# Patient Record
Sex: MALE | Race: BLACK OR AFRICAN AMERICAN | URBAN - METROPOLITAN AREA
[De-identification: names, ages, dates, MRNs, and addresses within clinical notes are randomized per-mention and may not be internally consistent; named-entity substitution may affect disease eponyms.]

---

## 2018-03-02 ENCOUNTER — EMERGENCY (EMERGENCY)
Facility: HOSPITAL | Age: 18
LOS: 1 days | Discharge: HOME | End: 2018-03-02
Attending: PEDIATRICS | Admitting: PEDIATRICS
Payer: COMMERCIAL

## 2018-03-02 VITALS
OXYGEN SATURATION: 100 % | RESPIRATION RATE: 18 BRPM | HEART RATE: 113 BPM | SYSTOLIC BLOOD PRESSURE: 132 MMHG | DIASTOLIC BLOOD PRESSURE: 52 MMHG | TEMPERATURE: 104 F

## 2018-03-02 VITALS — SYSTOLIC BLOOD PRESSURE: 116 MMHG | DIASTOLIC BLOOD PRESSURE: 77 MMHG

## 2018-03-02 LAB
ALBUMIN SERPL ELPH-MCNC: 4.2 G/DL — SIGNIFICANT CHANGE UP (ref 3.3–5)
ALP SERPL-CCNC: 62 U/L — SIGNIFICANT CHANGE UP (ref 40–120)
ALT FLD-CCNC: 8 U/L RC — LOW (ref 10–45)
ANION GAP SERPL CALC-SCNC: 10 MMOL/L — SIGNIFICANT CHANGE UP (ref 5–17)
AST SERPL-CCNC: 16 U/L — SIGNIFICANT CHANGE UP (ref 10–40)
BASOPHILS # BLD AUTO: 0 K/UL — SIGNIFICANT CHANGE UP (ref 0–0.2)
BASOPHILS NFR BLD AUTO: 0 % — SIGNIFICANT CHANGE UP (ref 0–2)
BILIRUB SERPL-MCNC: 1.2 MG/DL — SIGNIFICANT CHANGE UP (ref 0.2–1.2)
BUN SERPL-MCNC: 22 MG/DL — SIGNIFICANT CHANGE UP (ref 7–23)
CALCIUM SERPL-MCNC: 9.3 MG/DL — SIGNIFICANT CHANGE UP (ref 8.4–10.5)
CHLORIDE SERPL-SCNC: 107 MMOL/L — SIGNIFICANT CHANGE UP (ref 96–108)
CO2 SERPL-SCNC: 25 MMOL/L — SIGNIFICANT CHANGE UP (ref 22–31)
CREAT SERPL-MCNC: 1.14 MG/DL — SIGNIFICANT CHANGE UP (ref 0.5–1.3)
EOSINOPHIL # BLD AUTO: 0 K/UL — SIGNIFICANT CHANGE UP (ref 0–0.5)
EOSINOPHIL NFR BLD AUTO: 0.1 % — SIGNIFICANT CHANGE UP (ref 0–6)
GLUCOSE SERPL-MCNC: 115 MG/DL — HIGH (ref 70–99)
HCT VFR BLD CALC: 38.8 % — LOW (ref 39–50)
HGB BLD-MCNC: 13.5 G/DL — SIGNIFICANT CHANGE UP (ref 13–17)
LIDOCAIN IGE QN: 24 U/L — SIGNIFICANT CHANGE UP (ref 7–60)
LYMPHOCYTES # BLD AUTO: 0.6 K/UL — LOW (ref 1–3.3)
LYMPHOCYTES # BLD AUTO: 5 % — LOW (ref 13–44)
MCHC RBC-ENTMCNC: 30.8 PG — SIGNIFICANT CHANGE UP (ref 27–34)
MCHC RBC-ENTMCNC: 34.7 GM/DL — SIGNIFICANT CHANGE UP (ref 32–36)
MCV RBC AUTO: 88.7 FL — SIGNIFICANT CHANGE UP (ref 80–100)
MONOCYTES # BLD AUTO: 0.1 K/UL — SIGNIFICANT CHANGE UP (ref 0–0.9)
MONOCYTES NFR BLD AUTO: 1.2 % — LOW (ref 2–14)
NEUTROPHILS # BLD AUTO: 10.3 K/UL — HIGH (ref 1.8–7.4)
NEUTROPHILS NFR BLD AUTO: 93.7 % — HIGH (ref 43–77)
PLATELET # BLD AUTO: 228 K/UL — SIGNIFICANT CHANGE UP (ref 150–400)
POTASSIUM SERPL-MCNC: 3.6 MMOL/L — SIGNIFICANT CHANGE UP (ref 3.5–5.3)
POTASSIUM SERPL-SCNC: 3.6 MMOL/L — SIGNIFICANT CHANGE UP (ref 3.5–5.3)
PROT SERPL-MCNC: 6.8 G/DL — SIGNIFICANT CHANGE UP (ref 6–8.3)
RBC # BLD: 4.37 M/UL — SIGNIFICANT CHANGE UP (ref 4.2–5.8)
RBC # FLD: 11.2 % — SIGNIFICANT CHANGE UP (ref 10.3–14.5)
S PYO AG SPEC QL IA: NEGATIVE — SIGNIFICANT CHANGE UP
SODIUM SERPL-SCNC: 142 MMOL/L — SIGNIFICANT CHANGE UP (ref 135–145)
WBC # BLD: 11 K/UL — HIGH (ref 3.8–10.5)
WBC # FLD AUTO: 11 K/UL — HIGH (ref 3.8–10.5)

## 2018-03-02 PROCEDURE — 96374 THER/PROPH/DIAG INJ IV PUSH: CPT

## 2018-03-02 PROCEDURE — 82962 GLUCOSE BLOOD TEST: CPT

## 2018-03-02 PROCEDURE — 99284 EMERGENCY DEPT VISIT MOD MDM: CPT | Mod: 25

## 2018-03-02 PROCEDURE — 80053 COMPREHEN METABOLIC PANEL: CPT

## 2018-03-02 PROCEDURE — 83690 ASSAY OF LIPASE: CPT

## 2018-03-02 PROCEDURE — 85027 COMPLETE CBC AUTOMATED: CPT

## 2018-03-02 PROCEDURE — 87081 CULTURE SCREEN ONLY: CPT

## 2018-03-02 PROCEDURE — 87880 STREP A ASSAY W/OPTIC: CPT

## 2018-03-02 PROCEDURE — 99284 EMERGENCY DEPT VISIT MOD MDM: CPT

## 2018-03-02 PROCEDURE — 96375 TX/PRO/DX INJ NEW DRUG ADDON: CPT

## 2018-03-02 RX ORDER — ONDANSETRON 8 MG/1
1 TABLET, FILM COATED ORAL
Qty: 9 | Refills: 0
Start: 2018-03-02 | End: 2018-03-04

## 2018-03-02 RX ORDER — IBUPROFEN 200 MG
600 TABLET ORAL ONCE
Refills: 0 | Status: COMPLETED | OUTPATIENT
Start: 2018-03-02 | End: 2018-03-02

## 2018-03-02 RX ORDER — ACETAMINOPHEN 500 MG
1000 TABLET ORAL ONCE
Refills: 0 | Status: COMPLETED | OUTPATIENT
Start: 2018-03-02 | End: 2018-03-02

## 2018-03-02 RX ORDER — SODIUM CHLORIDE 9 MG/ML
2000 INJECTION INTRAMUSCULAR; INTRAVENOUS; SUBCUTANEOUS ONCE
Refills: 0 | Status: COMPLETED | OUTPATIENT
Start: 2018-03-02 | End: 2018-03-02

## 2018-03-02 RX ORDER — ONDANSETRON 8 MG/1
4 TABLET, FILM COATED ORAL ONCE
Refills: 0 | Status: COMPLETED | OUTPATIENT
Start: 2018-03-02 | End: 2018-03-02

## 2018-03-02 RX ADMIN — Medication 400 MILLIGRAM(S): at 16:49

## 2018-03-02 RX ADMIN — Medication 600 MILLIGRAM(S): at 18:45

## 2018-03-02 RX ADMIN — SODIUM CHLORIDE 2000 MILLILITER(S): 9 INJECTION INTRAMUSCULAR; INTRAVENOUS; SUBCUTANEOUS at 16:49

## 2018-03-02 RX ADMIN — ONDANSETRON 4 MILLIGRAM(S): 8 TABLET, FILM COATED ORAL at 16:49

## 2018-03-02 NOTE — ED PROVIDER NOTE - ATTENDING CONTRIBUTION TO CARE
I performed a history and physical exam of the patient and discussed their management with the resident. I reviewed the resident's note and agree with the documented findings and plan of care.  Beverley Matamoros MD     18y M with vomiting and diarrhea after 3 hours after eating pizza and drinking tea. Other people ate the pizza, no similar symptoms. Had sore throat yesterday, no cough. NO rash. No abd pain other than when vomiting.   Febrile, tachy  Gen: vomiting  HEENT: no conjunctivitis, MMM exudates on bilateral tonsils  SHotty LAD nontender  Neck supple  Cardiac: tachy, normal S1S2  Chest: CTA BL, no wheeze or crackles  Abdomen: normal BS, soft, NT  Extremity: no gross deformity, good perfusion  Skin: no rash  Neuro: grossly normal     AP 18y M with fever, vomiting, diarrhea. Exudates on tonsils. Likely viral gastro, +/- strep. Will send strep test. Labs, fluids, antipyretics, reassess.

## 2018-03-02 NOTE — ED PROVIDER NOTE - MEDICAL DECISION MAKING DETAILS
Patient with vomiting and diarrhea, no abdominal pain, +febrile. Most likely viral syndrome. Sore throat centor 2, low suspicion strep.  Check labs for electrolyte abnormalities, give fluids, zofran reassess.

## 2018-03-02 NOTE — ED PROVIDER NOTE - PROGRESS NOTE DETAILS
Patient feeling better. Awaiting strep. Patient feeling better. strep negative. Tolerating PO  Elizabeth Delgadillo, PGY3

## 2018-03-02 NOTE — ED PROVIDER NOTE - OBJECTIVE STATEMENT
Patient has had a sore throat for 2 days. went out for his birthday last night and smoked a few cigarettes, no alcohol or drug use.  Today felt ill at work after eating pizza and drinking tea, had an episode of loose stool then had a few episodes NBNB vomiting.  Denies abdominal pain, +chills and feeling generally weak and nauseated.  Has taken nothing for symptoms. Febrile on arrival.  Denies  symptoms, sexually active with one partner, occasional condom use, tested for STIs 6 months ago, declines testing today.

## 2018-03-02 NOTE — ED PROVIDER NOTE - CARE PLAN
Principal Discharge DX:	Vomiting  Assessment and plan of treatment:	Take tylenol 650 mg every 6 hours as needed for pain/fever, max daily dose 3250 mg/day.   Take ibuprofen 600 mg every 6 hours as needed for fever not controlled with tylenol, take with food and plenty of water   Maalox and pepcid for upset stomach as directed on packaging.  Take Zofran up to 3 times/day as needed for nausea  Remain well hydrated with 1-2 liters of water/day until urine is clear. Include 12 oz sports drink daily while ill.  Take small frequent sips, avoid drinking or eating large amounts at once.  [place] diet as tolerated (bananas, applesauce, crackers, toast, rice, oatmeal).   See your primary doctor in 2-3 days  Return to emergency department for worsening pain especially if localized to one spot on your abdomen, nausea/vomiting, inability to take water, fever >101 that does not respond to medication, rash, or if you have any new or changing symptoms or concerns

## 2018-03-02 NOTE — ED ADULT NURSE NOTE - OBJECTIVE STATEMENT
Pt bib EMS for eval of chills, vomiting, loose stools which started today after eating pizza,  and sore throat which he has had for 2 days.  He also c/o lightheadedness, but denies passing out.  Had vomited undigested food immediately prior to arrival in ED.

## 2018-03-02 NOTE — ED PROVIDER NOTE - PLAN OF CARE
Take tylenol 650 mg every 6 hours as needed for pain/fever, max daily dose 3250 mg/day.   Take ibuprofen 600 mg every 6 hours as needed for fever not controlled with tylenol, take with food and plenty of water   Maalox and pepcid for upset stomach as directed on packaging.  Take Zofran up to 3 times/day as needed for nausea  Remain well hydrated with 1-2 liters of water/day until urine is clear. Include 12 oz sports drink daily while ill.  Take small frequent sips, avoid drinking or eating large amounts at once.  [place] diet as tolerated (bananas, applesauce, crackers, toast, rice, oatmeal).   See your primary doctor in 2-3 days  Return to emergency department for worsening pain especially if localized to one spot on your abdomen, nausea/vomiting, inability to take water, fever >101 that does not respond to medication, rash, or if you have any new or changing symptoms or concerns

## 2020-05-18 NOTE — ED PROVIDER NOTE - MUSCULOSKELETAL [-], MLM
[facility] agency requested for notes from patients visit on 5-8-2020- Progress notes from visit were faxed to [phone].      Alysha Gonzalez MA    
See other note regarding medications - office visit notes and updated med list faxed to home care nurse pavan 5/18/2020 - she will assist with med rec and set up tomorrow       
no back pain

## 2021-05-28 NOTE — ED ADULT NURSE NOTE - PRIMARY CARE PROVIDER
[de-identified] : This 64-year-old male was referred for evaluation of hemoglobin 18.1, hematocrit 52.\par White count 9.7, platelets 332,000.\par Patient has no complaints.\par He was recently started on antihypertensives.\par Adam is a non-smoker and has no history of chronic lung disease.\par He snores but has no known diagnosis of obstructive sleep apnea. [de-identified] : Pulse ox in our office today is normal cristian

## 2021-07-31 ENCOUNTER — EMERGENCY (EMERGENCY)
Facility: HOSPITAL | Age: 21
LOS: 1 days | Discharge: HOME | End: 2021-07-31
Admitting: EMERGENCY MEDICINE
Payer: COMMERCIAL

## 2021-07-31 VITALS
OXYGEN SATURATION: 100 % | TEMPERATURE: 98 F | HEART RATE: 72 BPM | RESPIRATION RATE: 18 BRPM | SYSTOLIC BLOOD PRESSURE: 118 MMHG | DIASTOLIC BLOOD PRESSURE: 84 MMHG

## 2021-07-31 DIAGNOSIS — F43.20 ADJUSTMENT DISORDER, UNSPECIFIED: ICD-10-CM

## 2021-07-31 LAB
ALBUMIN SERPL ELPH-MCNC: 4.8 G/DL — SIGNIFICANT CHANGE UP (ref 3.3–5)
ALP SERPL-CCNC: 75 U/L — SIGNIFICANT CHANGE UP (ref 40–120)
ALT FLD-CCNC: 11 U/L — SIGNIFICANT CHANGE UP (ref 4–41)
ANION GAP SERPL CALC-SCNC: 12 MMOL/L — SIGNIFICANT CHANGE UP (ref 7–14)
APPEARANCE UR: CLEAR — SIGNIFICANT CHANGE UP
AST SERPL-CCNC: 15 U/L — SIGNIFICANT CHANGE UP (ref 4–40)
BASOPHILS # BLD AUTO: 0.07 K/UL — SIGNIFICANT CHANGE UP (ref 0–0.2)
BASOPHILS NFR BLD AUTO: 1.1 % — SIGNIFICANT CHANGE UP (ref 0–2)
BILIRUB SERPL-MCNC: 0.7 MG/DL — SIGNIFICANT CHANGE UP (ref 0.2–1.2)
BILIRUB UR-MCNC: NEGATIVE — SIGNIFICANT CHANGE UP
BUN SERPL-MCNC: 20 MG/DL — SIGNIFICANT CHANGE UP (ref 7–23)
CALCIUM SERPL-MCNC: 10 MG/DL — SIGNIFICANT CHANGE UP (ref 8.4–10.5)
CHLORIDE SERPL-SCNC: 109 MMOL/L — HIGH (ref 98–107)
CO2 SERPL-SCNC: 23 MMOL/L — SIGNIFICANT CHANGE UP (ref 22–31)
COLOR SPEC: SIGNIFICANT CHANGE UP
CREAT SERPL-MCNC: 1.06 MG/DL — SIGNIFICANT CHANGE UP (ref 0.5–1.3)
DIFF PNL FLD: NEGATIVE — SIGNIFICANT CHANGE UP
EOSINOPHIL # BLD AUTO: 0.11 K/UL — SIGNIFICANT CHANGE UP (ref 0–0.5)
EOSINOPHIL NFR BLD AUTO: 1.7 % — SIGNIFICANT CHANGE UP (ref 0–6)
GLUCOSE SERPL-MCNC: 70 MG/DL — SIGNIFICANT CHANGE UP (ref 70–99)
GLUCOSE UR QL: NEGATIVE — SIGNIFICANT CHANGE UP
HCT VFR BLD CALC: 46.1 % — SIGNIFICANT CHANGE UP (ref 39–50)
HGB BLD-MCNC: 15.3 G/DL — SIGNIFICANT CHANGE UP (ref 13–17)
IANC: 3.74 K/UL — SIGNIFICANT CHANGE UP (ref 1.5–8.5)
IMM GRANULOCYTES NFR BLD AUTO: 0.2 % — SIGNIFICANT CHANGE UP (ref 0–1.5)
KETONES UR-MCNC: NEGATIVE — SIGNIFICANT CHANGE UP
LEUKOCYTE ESTERASE UR-ACNC: NEGATIVE — SIGNIFICANT CHANGE UP
LYMPHOCYTES # BLD AUTO: 2.03 K/UL — SIGNIFICANT CHANGE UP (ref 1–3.3)
LYMPHOCYTES # BLD AUTO: 31.3 % — SIGNIFICANT CHANGE UP (ref 13–44)
MCHC RBC-ENTMCNC: 28.8 PG — SIGNIFICANT CHANGE UP (ref 27–34)
MCHC RBC-ENTMCNC: 33.2 GM/DL — SIGNIFICANT CHANGE UP (ref 32–36)
MCV RBC AUTO: 86.7 FL — SIGNIFICANT CHANGE UP (ref 80–100)
MONOCYTES # BLD AUTO: 0.52 K/UL — SIGNIFICANT CHANGE UP (ref 0–0.9)
MONOCYTES NFR BLD AUTO: 8 % — SIGNIFICANT CHANGE UP (ref 2–14)
NEUTROPHILS # BLD AUTO: 3.74 K/UL — SIGNIFICANT CHANGE UP (ref 1.8–7.4)
NEUTROPHILS NFR BLD AUTO: 57.7 % — SIGNIFICANT CHANGE UP (ref 43–77)
NITRITE UR-MCNC: NEGATIVE — SIGNIFICANT CHANGE UP
NRBC # BLD: 0 /100 WBCS — SIGNIFICANT CHANGE UP
NRBC # FLD: 0 K/UL — SIGNIFICANT CHANGE UP
PCP SPEC-MCNC: SIGNIFICANT CHANGE UP
PH UR: 7.5 — SIGNIFICANT CHANGE UP (ref 5–8)
PLATELET # BLD AUTO: 288 K/UL — SIGNIFICANT CHANGE UP (ref 150–400)
POTASSIUM SERPL-MCNC: 4.3 MMOL/L — SIGNIFICANT CHANGE UP (ref 3.5–5.3)
POTASSIUM SERPL-SCNC: 4.3 MMOL/L — SIGNIFICANT CHANGE UP (ref 3.5–5.3)
PROT SERPL-MCNC: 7.2 G/DL — SIGNIFICANT CHANGE UP (ref 6–8.3)
PROT UR-MCNC: ABNORMAL
RBC # BLD: 5.32 M/UL — SIGNIFICANT CHANGE UP (ref 4.2–5.8)
RBC # FLD: 11.6 % — SIGNIFICANT CHANGE UP (ref 10.3–14.5)
SARS-COV-2 RNA SPEC QL NAA+PROBE: SIGNIFICANT CHANGE UP
SODIUM SERPL-SCNC: 144 MMOL/L — SIGNIFICANT CHANGE UP (ref 135–145)
SP GR SPEC: 1.03 — HIGH (ref 1.01–1.02)
TOXICOLOGY SCREEN, DRUGS OF ABUSE, SERUM RESULT: SIGNIFICANT CHANGE UP
TSH SERPL-MCNC: 4.04 UIU/ML — SIGNIFICANT CHANGE UP (ref 0.27–4.2)
UROBILINOGEN FLD QL: SIGNIFICANT CHANGE UP
WBC # BLD: 6.48 K/UL — SIGNIFICANT CHANGE UP (ref 3.8–10.5)
WBC # FLD AUTO: 6.48 K/UL — SIGNIFICANT CHANGE UP (ref 3.8–10.5)

## 2021-07-31 PROCEDURE — 99284 EMERGENCY DEPT VISIT MOD MDM: CPT | Mod: 25

## 2021-07-31 PROCEDURE — 93010 ELECTROCARDIOGRAM REPORT: CPT | Mod: NC

## 2021-07-31 PROCEDURE — 90792 PSYCH DIAG EVAL W/MED SRVCS: CPT

## 2021-07-31 NOTE — ED PROVIDER NOTE - HEME LYMPH, MLM
Skin normal color for race, warm, dry and intact. No evidence of rash. No adenopathy or splenomegaly. No cervical or inguinal lymphadenopathy.

## 2021-07-31 NOTE — ED BEHAVIORAL HEALTH ASSESSMENT NOTE - DESCRIPTION
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     T(C): 36.7 (31 Jul 2021 15:31), Max: 36.7 (31 Jul 2021 15:31)  T(F): 98 (31 Jul 2021 15:31), Max: 98 (31 Jul 2021 15:31)  HR: 72 (31 Jul 2021 15:31) (72 - 72)  BP: 118/84 (31 Jul 2021 15:31) (118/84 - 118/84)  RR: 18 (31 Jul 2021 15:31) (18 - 18)  SpO2: 100% (31 Jul 2021 15:31) (100% - 100%)

## 2021-07-31 NOTE — ED BEHAVIORAL HEALTH ASSESSMENT NOTE - HPI (INCLUDE ILLNESS QUALITY, SEVERITY, DURATION, TIMING, CONTEXT, MODIFYING FACTORS, ASSOCIATED SIGNS AND SYMPTOMS)
21 M, domiciled with mother and sister, no PPHx, no known PMHx, no history of SAs, no history of psychiatric hospitalizations, unemployed, presenting from home brought by mother for decrease in function at home secondary to preoccupation with physical symptoms.    Patient reports that for the last year and 5 months, he has experienced a phenomenon where sunlight, heat, and humidity make him get hives on his skin. He says that they are like bubbles and that they become very painful. When they become very painful, he sometimes gets transient passive suicidal ideation. He states that he does not think there is anything wrong with him psychiatrically and that all his problems are physical. There has been no acute exacerbation. His physical problems are preventing him from leaving the house and he often walks around with a wet shirt on to prevent himself from heating up. 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 He does shower at least once a day, brushes his teeth daily, and sleeps at some point every day.     Denies depressed mood, denies anhedonia although is more socially isolated. Has been watching shows and playing games. Denies poor energy, poor concentration. Endorses irregular sleep and irregular appetite, says he has not had any changes in weight. Endorses transient passive suicidal ideation only in context of spikes in pain from physical symptoms. Denies any active suicidal ideation, intent, or plan. Denies lifetime manic symptoms. Denies lifetime history of auditory or visual hallucinations, ideas of reference, paranoid ideation.     Spoke to mother at . Reports that patient has basically not left his room in well over a year. He has seen a dermatologist, neurologist, and his primary care physician and nobody has been able to explain his symptoms. He gets goosebumps and hives on his skin and he reports pain during these times. She reports that Shay has been sleeping poorly and has not been able to function normally. She thinks that he is depressed and says that sometimes when his physical symptom get really bad, he stops responding and just lays there; she does not want this to happen again. She wants the patient to be psychiatrically hospitalized so that his physical symptoms can be worked up and he can simultaneously receive psychiatric treatment; discussed that a psychiatric hospital is not an ideal environment for a medical workup. 21 M, domiciled with mother and sister, no PPHx, no known PMHx, no history of SAs, no history of psychiatric hospitalizations, unemployed, presenting from home brought by mother for decrease in function at home secondary to preoccupation with physical symptoms.    Patient reports that for the last year and 5 months, he has experienced a phenomenon where sunlight, heat, and humidity make him get hives on his skin. He says that they are like bubbles and that they become very painful. When they become very painful, he sometimes gets transient passive suicidal ideation. He states that he does not think there is anything wrong with him psychiatrically and that all his problems are physical. There has been no acute exacerbation. His physical problems are preventing him from leaving the house and he often walks around with a wet shirt on to prevent himself from heating up. 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 He does shower at least once a day, brushes his teeth daily, and sleeps at some point every day.     Denies depressed mood, denies anhedonia although is more socially isolated. Has been watching shows and playing games. Denies poor energy, poor concentration. Endorses irregular sleep and irregular appetite, says he has not had any changes in weight. Endorses transient passive suicidal ideation only in context of spikes in pain from physical symptoms. Denies any active suicidal ideation, intent, or plan. Denies lifetime manic symptoms. Denies lifetime history of auditory or visual hallucinations, ideas of reference, paranoid ideation. Amenable to following up with [facility].    Spoke to mother at . Reports that patient has basically not left his room in well over a year. He has seen a dermatologist, neurologist, and his primary care physician and nobody has been able to explain his symptoms. He gets goosebumps and hives on his skin and he reports pain during these times. She reports that Shay has been sleeping poorly and has not been able to function normally. She thinks that he is depressed and says that sometimes when his physical symptom get really bad, he stops responding and just lays there; she does not want this to happen again. She wants the patient to be psychiatrically hospitalized so that his physical symptoms can be worked up and he can simultaneously receive psychiatric treatment; discussed that a psychiatric hospital is not an ideal environment for a medical workup. Discussed following up with Crisis [facility]. 21 M, domiciled with mother and sister, no PPHx, no known PMHx, no history of SAs, no history of psychiatric hospitalizations, unemployed, presenting from home brought by mother for decrease in function at home secondary to preoccupation with physical symptoms.    Patient reports that for the last year and 5 months, he has experienced a phenomenon where sunlight, heat, and humidity make him get hives on his skin. He says that they are like bubbles and that they become very painful. When they become very painful, he sometimes gets transient passive suicidal ideation. He states that he does not think there is anything wrong with him psychiatrically and that all his problems are physical. There has been no acute exacerbation. His physical problems are preventing him from leaving the house and he often walks around with a wet shirt on to prevent himself from heating up. 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 He does shower at least once a day, brushes his teeth daily, and sleeps at some point every day.     Denies depressed mood, denies anhedonia although is more socially isolated. Has been watching shows and playing games. Denies poor energy, poor concentration. Endorses irregular sleep and irregular appetite, says he has not had any changes in weight. Endorses transient passive suicidal ideation only in context of spikes in pain from physical symptoms. Denies any active suicidal ideation, intent, or plan. Denies current suicidal ideation. Denies lifetime manic symptoms. Denies lifetime history of auditory or visual hallucinations, ideas of reference, paranoid ideation. Amenable to following up with at [facility] as patient lives in [place].     Spoke to mother at . Reports that patient has basically not left his room in well over a year. He has seen a dermatologist, neurologist, and his primary care physician and nobody has been able to explain his symptoms. He gets goosebumps and hives on his skin and he reports pain during these times. She reports that Shay has been sleeping poorly and has not been able to function normally. She thinks that he is depressed and says that sometimes when his physical symptom get really bad, he stops responding and just lays there; she does not want this to happen again. She wants the patient to be psychiatrically hospitalized so that his physical symptoms can be worked up and he can simultaneously receive psychiatric treatment; discussed that a psychiatric hospital is not an ideal environment for a medical workup. Discussed following up with [facility], family agreeable.

## 2021-07-31 NOTE — ED PROVIDER NOTE - OBJECTIVE STATEMENT
20 y/o M  BIB family secondary to increase paranoia.  Admit to not want to leaving the house because of the humidity . States " Heat makes me itchy and anxious , so I have to  constantly wet my clothes". Denies falling,  punching or kicking any objects. Denies SI/AH/HI/VH.  Denies pain,  chill, SOB, fever, chest/ abdominal discomfort. Denies use of  alcohol or illicit drugs. No evidence of physical injuries ,  broken  skin or deformities.

## 2021-07-31 NOTE — ED ADULT NURSE REASSESSMENT NOTE - NS ED NURSE REASSESS COMMENT FT1
pt calm & cooperative denies si/hi/avh presently, pt cleared by psych d/c by provider verbalizing understanding of d/c instructions.

## 2021-07-31 NOTE — ED BEHAVIORAL HEALTH ASSESSMENT NOTE - NSSUICPROTFACT_PSY_ALL_CORE
Supportive social network of family or friends help seeking in regards to his health/Identifies reasons for living/Supportive social network of family or friends

## 2021-07-31 NOTE — ED PROVIDER NOTE - PATIENT PORTAL LINK FT
You can access the FollowMyHealth Patient Portal offered by [facility] by registering at the following website: [email or web address]. By joining [facility]’s FollowMyHealth portal, you will also be able to view your health information using other applications (apps) compatible with our system.

## 2021-07-31 NOTE — ED BEHAVIORAL HEALTH ASSESSMENT NOTE - REFERRAL / APPOINTMENT DETAILS
[facility], [phone], M-F 9am-3pm [facility], [phone], M-F 9am-3pm, Also given information for [facility]

## 2021-07-31 NOTE — ED ADULT TRIAGE NOTE - CHIEF COMPLAINT QUOTE
Pt brought in by his family--pt acting paranoid--not sleeping-wont go outside. Pt states he needs an intervention for his body.

## 2021-07-31 NOTE — ED BEHAVIORAL HEALTH ASSESSMENT NOTE - CASE SUMMARY
I evaluated patient, and I reviewed the above note and the assessment and plan as well as the collateral information provided. I reviewed patient records and I spoke with Dr. Ambrosio about this patient and am I agreeable with Dr. Ambrosio's assessment/plan. In Short, the patient is a 21 M, domiciled with mother and sister, no PPHx, no known PMHx, no history of SAs, no history of psychiatric hospitalizations, unemployed, presenting from home brought by mother for decrease in function at home secondary to preoccupation with physical symptoms. The patient is not acutely manic, psychotic or depressed. He has no psychiatric history, and he denies suicidal ideation intent and plan. He has concerns regarding a chronic skin condition that may or may not be delusional in nature. However, his concerns regarding his skin are chronic, and although they do cause him some level of distress, namely passive suicidal ideation; the patient does not feel that he has psychiatric problems and does not want to be hospitalized. In addition, though the patient may benefit from psychiatric treatment, his current symptoms will not be modified on an inpatient unit, and he is at low acute risk of harm to self or others based on analysis of his risk and protective factors (see risk assessment below) and given this, he is appropriate for psychiatric treatment on an outpatient basis. Patient was given information for follow up and this was also discussed with his parents. His parents are aware of dispo plan and will  patient from ED.
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
## 2021-07-31 NOTE — ED PROVIDER NOTE - CLINICAL SUMMARY MEDICAL DECISION MAKING FREE TEXT BOX
22 y/o M    Labs, Urine Tox/UA, EKG  Medical evaluation performed. There is no clinical evidence of intoxication or any acute medical problem requiring immediate intervention. Patient is awaiting psychiatric consultation. Final disposition will be determined by psychiatrist.

## 2021-07-31 NOTE — ED BEHAVIORAL HEALTH ASSESSMENT NOTE - SUMMARY
21 M, domiciled with mother and sister, no PPHx, no known PMHx, no history of SAs, no history of psychiatric hospitalizations, unemployed, presenting from home brought by mother for decrease in function at home secondary to preoccupation with physical symptoms which involve hives on skin as a response to heat, humidity, and sunlight.    DDx includes adjustment disorder.    Patient denies depressed mood. At this time, patient reports irregular sleep and irregular appetite without change in weight. He also has intermittent transient passive SI in response to severe pain; this dissipates when pain goes away. Patient does not meet criteria for MDD. Patient denies history of manic symptoms. Denies history of AVH, IoR, and paranoid ideation. Patient appears to overvalue his physical symptomatology which may cause him to experience greater amounts of discomfort and pain than expected. Patient is not at acute danger of harm to self or others. Patient does not meet criteria for involuntary psychiatric hospitalization. Patient may benefit from cognitive behavioral therapy to help him cope with his physical symptoms; this would be pursued on an outpatient basis. 21 M, domiciled with mother and sister, no PPHx, no known PMHx, no history of SAs, no history of psychiatric hospitalizations, unemployed, presenting from home brought by mother for decrease in function at home secondary to preoccupation with physical symptoms which involve hives on skin as a response to heat, humidity, and sunlight.    DDx includes adjustment disorder.    Patient denies depressed mood. At this time, patient reports irregular sleep and irregular appetite without change in weight. He also has intermittent transient passive SI in response to severe pain; this dissipates when pain goes away. Patient does not meet criteria for MDD. Patient denies history of manic symptoms. Denies history of AVH, IoR, and paranoid ideation. Patient appears to overvalue his physical symptomatology which may cause him to experience greater amounts of discomfort and pain than expected. Patient is not at acute danger of harm to self or others. Patient does not meet criteria for involuntary psychiatric hospitalization. Patient does not want voluntary hospitalization to a psychiatric facility. Patient may benefit from cognitive behavioral therapy to help him cope with his physical symptoms; this would be pursued on an outpatient basis. 21 M, domiciled with mother and sister, no PPHx, no known PMHx, no history of SAs, no history of psychiatric hospitalizations, unemployed, presenting from home brought by mother for decrease in function at home secondary to preoccupation with physical symptoms which involve hives on skin as a response to heat, humidity, and sunlight.    DDx includes adjustment disorder.    Patient denies depressed mood. At this time, patient reports irregular sleep and irregular appetite without change in weight. He also has intermittent transient passive SI in response to severe pain; this dissipates when pain goes away. Patient does not meet criteria for MDD. Patient denies history of manic symptoms. Denies history of AVH, IoR, and paranoid ideation. Patient appears to overvalue his physical symptomatology which may cause him to experience greater amounts of discomfort and pain than expected. Patient is not at acute danger of harm to self or others. Patient does not meet criteria for involuntary psychiatric hospitalization. Patient does not want voluntary hospitalization to a psychiatric facility. Patient may benefit from cognitive behavioral therapy to help him cope with his physical symptoms; this would be pursued on an outpatient basis. Patient given information for both [facility] and [facility]. 21 M, domiciled with mother and sister, no PPHx, no known PMHx, no history of SAs, no history of psychiatric hospitalizations, unemployed, presenting from home brought by mother for decrease in function at home secondary to preoccupation with physical symptoms which involve hives on skin as a response to heat, humidity, and sunlight.    DDx includes adjustment disorder, delusional disorder, somatic symptom disorder    Patient denies depressed mood. At this time, patient reports irregular sleep and irregular appetite without change in weight. He also has intermittent transient passive SI in response to severe pain; this dissipates when pain goes away. Patient does not meet criteria for major depressive disorder. Patient denies history of manic symptoms. Denies history of AVH, IoR, and paranoid ideation. Patient appears to overvalue his physical symptomatology which may cause him to experience greater amounts of discomfort and pain than expected. Patient is not at acute danger of harm to self or others. Patient does not meet criteria for involuntary psychiatric hospitalization. Patient does not want voluntary hospitalization to a psychiatric facility. Patient may benefit from cognitive behavioral therapy to help him cope with his physical symptoms; this would be pursued on an outpatient basis. Patient given information for both [facility] and [facility].

## 2021-07-31 NOTE — ED ADULT NURSE NOTE - OBJECTIVE STATEMENT
Received pt in  pt bought in by family for eval pt verbalizing the humidity is affecting him, pt calm & cooperative denies si/hi/avh presently, safety & comfort measures maintained eval on going.

## 2021-07-31 NOTE — ED BEHAVIORAL HEALTH ASSESSMENT NOTE - RISK ASSESSMENT
Low acute risk of harm to self or suicide. Risk factors include episodes of acute pain leading to transient intermittent passive SI, unemployed, insomnia. Protective factors include denies ever having had active suicidal ideation, intent, or plan, no prior psychiatric hospitalizations, no history of SAs, no current passive SI, good support system at home. Low Acute Suicide Risk Low acute risk of harm to self or suicide. Risk factors include episodes of acute pain leading to transient intermittent passive SI, unemployed, insomnia. Protective factors include denies ever having had active suicidal ideation, intent, or plan, no prior psychiatric hospitalizations, no history of SAs, no current passive SI, good support system at home, identifies reasons to live. Pt's protective factors outweigh his risk factors, patient is not suicidal, and his symptoms are somatic in nature. He does not meet criteria for involuntary hospitalization as he is at low acute risk of harm to self or others; however he would benefit from outpatient treatment given the severity of his somatic symptoms.

## 2021-07-31 NOTE — ED BEHAVIORAL HEALTH ASSESSMENT NOTE - SAFETY PLAN ADDT'L DETAILS
Safety plan discussed with.../Education provided regarding environmental safety / lethal means restriction/Provision of National Suicide Prevention Lifeline 6-858-106-JSTP (7966)

## 2021-08-01 LAB
COVID-19 SPIKE DOMAIN AB INTERP: NEGATIVE — SIGNIFICANT CHANGE UP
COVID-19 SPIKE DOMAIN ANTIBODY RESULT: 0.4 U/ML — SIGNIFICANT CHANGE UP
SARS-COV-2 IGG+IGM SERPL QL IA: 0.4 U/ML — SIGNIFICANT CHANGE UP
SARS-COV-2 IGG+IGM SERPL QL IA: NEGATIVE — SIGNIFICANT CHANGE UP

## 2022-09-21 NOTE — ED ADULT NURSE NOTE - MODE OF DISCHARGE
Hematology referral request received from Yolanda DUNN (with Dr. 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 Patient to be scheduled for consultation with Dr. Narayanan.    Ambulatory